# Patient Record
Sex: FEMALE | Race: WHITE
[De-identification: names, ages, dates, MRNs, and addresses within clinical notes are randomized per-mention and may not be internally consistent; named-entity substitution may affect disease eponyms.]

---

## 2018-04-01 ENCOUNTER — HOSPITAL ENCOUNTER (EMERGENCY)
Dept: HOSPITAL 25 - ED | Age: 23
Discharge: HOME | End: 2018-04-01
Payer: COMMERCIAL

## 2018-04-01 DIAGNOSIS — J06.9: Primary | ICD-10-CM

## 2018-04-01 DIAGNOSIS — Z88.8: ICD-10-CM

## 2018-04-01 DIAGNOSIS — J45.909: ICD-10-CM

## 2018-04-01 DIAGNOSIS — Z88.0: ICD-10-CM

## 2018-04-01 DIAGNOSIS — H61.21: ICD-10-CM

## 2018-04-01 PROCEDURE — 99281 EMR DPT VST MAYX REQ PHY/QHP: CPT

## 2018-04-01 PROCEDURE — 87502 INFLUENZA DNA AMP PROBE: CPT

## 2018-04-01 PROCEDURE — 87651 STREP A DNA AMP PROBE: CPT

## 2018-04-01 NOTE — ED
Throat Pain/Nasal Congestion





- HPI Summary


HPI Summary: 





Patient here with sore throat since Monday.  She reports on Tuesday her throat 

felt worse and was accompanied by subjective fever.  This morning she has a 

headache but has not had one the rest of the week.  She has some mild dysphagia 

however still able to eat and drink without difficulty.  She also reports 

intermittent ear congestion- today, she has pain in her left ear and fullness 

and "plugged" sensation in right ear.  She had a cough last night and this 

morning with lying down most likely from postnasal drip.  She has minor nasal 

congestion.  Denies chest pain, shortness of breath, wheezing, chest tightness, 

abdominal pain, nausea, vomiting, diarrhea, rash, neck pain or stiffness.  She 

is been exposed to multiple family members with URI symptoms.  She herself has 

not had an influenza vaccine this year.  She has tried DayQuil, NyQuil and 

Children's Motrin for her symptoms.  Reports they have been helping keep her 

comfortable.  She has a history of tonsillectomy 2 years ago.  She had strep 

throat as a child.  She is a history of asthma which is been controlled since 

middle school.





- History of Current Complaint


Chief Complaint: EDThroatPain


Time Seen by Provider: 04/01/18 09:16


Hx Obtained From: Patient, Family/Caretaker - mom





- Allergies/Home Medications


Allergies/Adverse Reactions: 


 Allergies











Allergy/AdvReac Type Severity Reaction Status Date / Time


 


amoxicillin [From Augmentin] Allergy  Hives Verified 04/01/18 08:35


 


cefaclor [From Ceclor] Allergy  Hives Verified 04/01/18 08:35


 


cefixime [From Suprax] Allergy  Hives Verified 04/01/18 08:35


 


clavulanic acid Allergy  Hives Verified 04/01/18 08:35





[From Augmentin]     


 


erythromycin base Allergy  Hives Verified 04/01/18 08:35





[From Pediazole]     


 


sulfisoxazole Allergy  Hives Verified 04/01/18 08:35





[From Pediazole]     














PMH/Surg Hx/FS Hx/Imm Hx


Previously Healthy: Yes


Endocrine/Hematology History: 


   Denies: Hx Diabetes, Autoimmune Disease


Respiratory History: Reports: Hx Asthma - well controlled w/o meds


EENT History: Reports: Other - tonsilectomy 2 year ago


Infectious Disease History: No


Infectious Disease History: 


   Denies: Traveled Outside the [place] in Last 30 Days





- Family History


Known Family History: Positive: Cardiac Disease - MI, Other - uterine and 

pancreatic cancers





- Social History


Lives: With Family


Alcohol Use: Rare


Hx Substance Use: No


Substance Use Type: Reports: None


Hx Tobacco Use: No


Smoking Status (MU): Never Smoked Tobacco





Review of Systems


Positive: Fever.  Negative: Fatigue


Eyes: Negative


Positive: Sore Throat, Ear Ache, Nasal Discharge


Cardiovascular: Negative


Positive: Cough


Gastrointestinal: Negative


Positive: no symptoms reported


Musculoskeletal: Negative


Negative: Arthralgia, Myalgia


Skin: Negative


Positive: Headache


Psychological: Normal


All Other Systems Reviewed And Are Negative: Yes





Physical Exam


Triage Information Reviewed: Yes


Vital Signs On Initial Exam: 


 Initial Vitals











Temp Pulse Resp BP Pulse Ox


 


 97.9 F   102   14   110/69   97 


 


 04/01/18 08:36  04/01/18 08:36  04/01/18 08:36  04/01/18 08:36  04/01/18 08:36











Vital Signs Reviewed: Yes


Appearance: Positive: Well-Appearing, No Pain Distress, Obese


Skin: Positive: Warm, Skin Color Reflects Adequate Perfusion, Dry - no rash


Head/Face: Positive: Normal Head/Face Inspection


Eyes: Positive: Normal, EOMI, Conjunctiva Clear.  Negative: Conjunctiva 

Inflammed, Discharge


ENT: Positive: Hearing grossly normal, Nasal congestion - erythematous, 

edematous turbinates, Nasal drainage - clear/yellow tacky mucous d/c, TMs 

normal - Lt normal; Rt occluded by black cerumen - tragus and mastoids NTTP, 

Uvula midline.  Negative: Pharynx normal - cobblestoning, Tonsillar swelling, 

Tonsillar exudate, Trismus, Muffled voice, Hoarse voice, Dental tenderness, 

Sinus tenderness


Dental: Negative: Abscess @


Neck: Positive: Supple, No Lymphadenopathy, Tenderness @ - submandibular TTP


Respiratory/Lung Sounds: Positive: Clear to Auscultation, Breath Sounds 

Present.  Negative: Rales, Rhonchi, Stridor, Tracheal Deviation, Wheezes


Cardiovascular: Positive: Normal, RRR, S1, S2.  Negative: Murmur, Rub


Abdomen Description: Positive: Nontender, No Organomegaly, Soft


Bowel Sounds: Positive: Present


Musculoskeletal: Positive: Normal, Strength/ROM Intact


Neurological: Positive: Normal, Sensory/Motor Intact, Alert, Oriented to Person 

Place, Time, CN Intact II-III


Psychiatric: Positive: Normal





Diagnostics





- Vital Signs


 Vital Signs











  Temp Pulse Resp BP Pulse Ox


 


 04/01/18 08:36  97.9 F  102  14  110/69  97














- Laboratory


Lab Statement: Any lab studies that have been ordered have been reviewed, and 

results considered in the medical decision making process.





EENT Course/Dx





- Course


Course Of Treatment: Patient's rapid influenza and strep swabs are all 

negative.  She declines Mono testing.  Advised better control of her postnasal 

drip which appears to be causing her sore throat, ear pressure/pain, and cough 

when she is lying down.  She will implement more supportive care and follow-up 

with PCP if symptoms persist or worsen.  Danger signs and symptoms reviewed for 

when to return to the emergency department.  Patient and mother agree with plan.





- Diagnoses


Provider Diagnoses: 


 Viral URI, Right ear impacted cerumen








Discharge





- Sign-Out/Discharge


Documenting (check all that apply): Discharge





- Discharge Plan


Condition: Stable


Disposition: HOME


Patient Education Materials:  Upper Respiratory Infection (ED), Cerumen 

Impaction (ED)


Referrals: 


Shala Ortiz MD [Primary Care Provider] - 


Additional Instructions: 


Nasal wash (netti pot or saline spray) & salt water throat gargles 2 x day 


Drink  you body weight in ounces of water every day


Sleep 8+ hours per night


Avoid Dairy and sugar


Hot herbal/decaf tea with lemon & honey  


Chicken broth (preferably organic, free range chicken)


Humidifier in house, but especially near bed at night


Keep home temperature at 68F or less to reduce dryness


Use cough drops/throat lozenges


Try a facial steam with or without eucalyptus essential oil or Lars's Vapor rub 

for congestion


Avoid smoke, candles, perfumes, colognes, scented soaps/detergents , air 

fresheners and cleaning chemicals as these can cause airway irritation and 

trigger coughing


*If congestion persists, you may try Sudafed to reduce ear pressure


**Start Vitamin D3 5000iu and Vitamin C 1000mg every day x winter months**








For your cerumen impaction, you may try debrox ear drops to soften wax. If 

symptoms persist, follow-up with PCP for lavage/disimpaction. 





- Billing Disposition and Condition


Condition: STABLE


Disposition: HOME